# Patient Record
Sex: FEMALE | Race: ASIAN | NOT HISPANIC OR LATINO | URBAN - METROPOLITAN AREA
[De-identification: names, ages, dates, MRNs, and addresses within clinical notes are randomized per-mention and may not be internally consistent; named-entity substitution may affect disease eponyms.]

---

## 2017-01-16 ENCOUNTER — OFFICE VISIT (OUTPATIENT)
Dept: OTOLARYNGOLOGY | Facility: CLINIC | Age: 62
End: 2017-01-16

## 2017-01-16 DIAGNOSIS — R09.89 CHRONIC THROAT CLEARING: ICD-10-CM

## 2017-01-16 DIAGNOSIS — R07.0 THROAT PAIN: Primary | ICD-10-CM

## 2017-01-16 DIAGNOSIS — J38.7 IRRITABLE LARYNX SYNDROME: Primary | ICD-10-CM

## 2017-01-16 DIAGNOSIS — J38.7 IRRITABLE LARYNX: ICD-10-CM

## 2017-01-16 DIAGNOSIS — R07.0 THROAT PAIN: ICD-10-CM

## 2017-01-16 RX ORDER — CHLORAL HYDRATE 500 MG
2 CAPSULE ORAL DAILY
COMMUNITY
End: 2018-04-24 | Stop reason: ALTCHOICE

## 2017-01-16 RX ORDER — CALCIUM CARBONATE 500(1250)
500 TABLET ORAL 2 TIMES DAILY
COMMUNITY
End: 2018-04-24 | Stop reason: ALTCHOICE

## 2017-01-16 ASSESSMENT — PAIN SCALES - GENERAL: PAINLEVEL: NO PAIN (0)

## 2017-01-16 NOTE — Clinical Note
1/16/2017      RE: CARLITOS Tiwari  [address] [facility] [address]       Dear Mr. Hull:    I had the pleasure of meeting CARLITOS Tiwari in consultation at the [facility] of the [facility] at your request, for evaluation of dysphagia. The note from our visit follows.  I appreciate the opportunity to participate in the care of this pleasant patient.    Please feel free to contact me with any questions.    Sincerely yours,    Sherrell Arguello M.D., M.P.H.  , Laryngology  Director, [facility] Voice [facility]  Otolaryngology- Head & Neck Surgery  [phone]          =====    History of Present Illness:   CARLITOS Tiwari is a pleasant 61-year-old female who presents with a history of dysphagia. Symptoms include pain/ache in throat and frequent throat-clearing.      Voice  No concerns      Swallowing, Throat-clearing, Cough  She has had symptoms for about a year. She states it is intermittent and began gradually.  She reports palate discomfort, and sometimes a sore throat, but the palate bothers her more.  She clicks her palate and sometimes clears her throat because it feels like something is there. The palate clicking causes pain. Her cough is typically dry.     These symptoms come and go over a couple weeks at a time. In between episodes she feels normal. She is not sure what provokes the symptoms. She was taking reflux medications, but has stopped, with no change in her symptoms. However, certain foods like tomatoes do provoke symptoms. She has no nasal symptoms. She was treated for possible sinus symptoms with antibiotics/steroids and a nasal spray, which did not help.  She does not experience things going down the wrong tube. Her swallowing works, but feels effortful.  She does experience increased swallowing effort with solids.      Breathing  No concerns.      Reflux-type symptoms  She experiences heartburn/indigestion weekly. She is  not taking reflux medications.    EGD showed a small hiatal hernia.  She has a history of H. pylori gastritis, which was treated about a year ago.      Prior outside records were reviewed for this visit.    MEDICATIONS:     Current Outpatient Prescriptions   Medication Sig Dispense Refill     Multiple Vitamins-Minerals (MULTIVITAMIN ADULT PO)        calcium carbonate (OS- MG [place]. [place]) 500 MG tablet Take 500 mg by mouth 2 times daily       fish oil-omega-3 fatty acids 1000 MG capsule Take 2 g by mouth daily         ALLERGIES:  Allergies not on file    PAST MEDICAL HISTORY: No past medical history on file.   Reflux  Osteoporosis  Vitamin D deficiency  Bilateral cataracts  Glaucoma    PAST SURGICAL HISTORY: No past surgical history on file.   Appendectomy  Cataract surgeries    HABITS/SOCIAL HISTORY:    Social History   Substance Use Topics     Smoking status: Not on file     Smokeless tobacco: Not on file     Alcohol Use: Not on file         FAMILY HISTORY:  No family history on file.    REVIEW OF SYSTEMS:  The patient completed a comprehensive 11 point review of systems (below), which was reviewed. Positives are as noted below; pertinent findings are as noted in the HPI.     Patient Supplied Answers to Review of Systems   ENT ROS 1/16/2017   Ears, Nose, Throat Trouble swallowing   Gastrointestinal/Genitourinary Heartburn/indigestion       PHYSICAL EXAMINATION:  General: The patient was alert and conversant, and in no acute distress. Patient accompanied by her spouse.  Eyes: PERRL, conjunctiva and lids normal, sclera nonicteric.  Nose: Anterior rhinoscopy: no gross abnormalities. no  bleeding; no  mucopurulence; septum grossly normal, mild mucoid drainage and/or crusting.  Oral cavity/oropharynx: No masses or lesions. Dentition in fair condition. Floor of mouth and oral tongue soft to palpation. Tongue mobility and palate elevation intact and symmetric.  Ears: Normal auricles, external auditory canals  bilaterally. Visualized portions of tympanic membranes normal bilaterally.   Neck: No palpable cervical lymphadenopathy. There was moderate  tenderness to palpation of the thyrohyoid space. No obvious thyroid abnormality. Landmarks palpable.  Resp: Breathing comfortably, no stridor or stertor.  Neuro: Symmetric facial function. Other cranial nerves as documented above.  Psych: Normal affect, pleasant and cooperative.  Voice/speech: Mild dysphonia characterized by roughness and strain. Frequent throat clearing and palate clicking.  Extremities: No cyanosis, clubbing, or edema of the upper extremities.      Intake scores  Total Score for Last Patient-Answered VHI Questionnaire  VHI Total Score 1/16/2017   VHI Total Score 0     Total Score for Last Patient-Answered EAT Questionnaire  EAT Total Score 1/16/2017   EAT Total Score 10     Total Score for Last Patient-Answered CSI Questionnaire  CSI Total Score 1/16/2017   CSI Total Score 0         PROCEDURE:   Flexible fiberoptic laryngoscopy  Indications: This procedure was warranted to evaluate the patient's laryngeal function. Risks, benefits, and alternatives were discussed.  Description: After written informed consent was obtained, a time-out was performed to confirm patient identity, procedure, and procedure site. Topical 3% lidocaine with 0.25% phenylephrine was applied to the nasal cavities. I performed the endoscopy and no complications were apparent.  Performed by: Sherrell Arguello MD MPH  SLP: Kevin Frausto MM, MA, CCC-SLP   Findings: Normal nasopharynx. Normal base of tongue, valleculae, and epiglottis. The right true vocal fold demonstrated normal mobility. The left true vocal fold demonstrated normal mobility. The medial edges of the vocal folds appeared smooth and straight. No focal mucosal lesions were observed on the true vocal folds. Glissade was associated with some elongation although patient had difficulty elevating pitch into falsetto. There was  moderate supraglottic recruitment with connected speech. Mucosa of the false vocal folds, aryepiglottic folds, piriform sinuses, and posterior glottis unremarkable. Airway was patent.       IMAGING/RESULTS reviewed, with pertinent report excerpts below:  11/3/16 Video swallow with speech  Normal videofluoroscopic swallow exam    Manometry September 2016  Resting LES pressure normal, but residual pressure elevated at 15.6 mm. Esophageal body motor function normal. Noted to have outflow tract obstruction.    pH Impedance study: negative [place], no proximal esophageal reflux    IMPRESSION AND PLAN:   CARLITOS Tiwari presents with irritable larynx syndrome as well as pain associated with volitional palate clicking which she does in an attempt to address her throat irritation. This is in the context of a history of H. Pylori gastritis and known small hiatal hernia, but her GI issues sound like they are managed and the H. Pylori has been treated.    I recommended speech therapy as initial primary management, with goals including improving laryngeal hygiene, reducing laryngeal irritability, decreasing vocal fold trauma and improving respiratory/phonatory coordination. She will return as needed. I appreciate the opportunity to participate in the care of this patient.       Sherrell Arguello MD

## 2017-01-16 NOTE — Clinical Note
1/16/2017       RE: CARLITOS Tiwari  [address] [facility] [address]     Dear Colleague,    Thank you for referring your patient, CARLITOS Tiwari, to the [facility] EAR NOSE AND THROAT at [facility]. Please see a copy of my visit note below.    No notes on file    Again, thank you for allowing me to participate in the care of your patient.      Sincerely,    Sherrell Arguello MD

## 2017-01-16 NOTE — Clinical Note
1/16/2017       RE: CARLITOS Tiwari  [address] [facility] [address]     Dear Colleague,    Thank you for referring your patient, CARLITOS Tiwari, to the [facility] at [facility]. Please see a copy of my visit note below.    [facility]ICE [facility]  Jose Philip Jr., M.D., F.A.C.S.  Sherrell Arguello M.D., M.P.H.  Maribel Martínez, Ph.D., CCC/SLP  Amaris Garcia M.M. (voice), M.A., CCC/SLP  Thong Frausto M.M. (voice), M.A., [facility]/SLP    [facility] VOICE [facility]  INITIAL EVALUATION AND LARYNGEAL EXAMINATION REPORT    Patient: CARLITOS Tiwari  Date of Visit: 1/16/2017    CHIEF COMPLAINT: Throat pain, effortful swallowing  HISTORY  PATIENT INFORMATION  CARLITOS Tiwari was seen for initial voice evaluation, laryngeal examination and treatment in conjunction with a visit to Dr. Arguello.  Please refer to Dr. Arguello s dictation for a more complete history and impressions.   She was referred to this clinic by Dr. Hull at [facility].       DIAGNOSIS/REASON FOR REFERRAL  Dysphonia/ Evaluate, perform laryngeal exam, treat as appropriate    HISTORY OF VOICE DISORDER  Ms. Tiwari is a 61 year old woman with a history of throat and palatal pain, and corresponding effortful swallowing.  Salient details of her history are as follows:    Symptoms began approximately 1 year ago at approximately the same time as she was being treated for H pylori gastritis    She describes discomfort which originates near the palate.      In response to this discomfort she repeatedly clicks her palate, and subsequently the pain will progress to the level of the larynx, predominately on the right side    When the pain progresses to this level it is accompanied by increased effort swallowing.     She has had significant testing to discern the origin of the discomfort including:  o Swallow study - WNL  o EGD - small hiatal hernia  o Reflux medications - No benefit  o Potential sinus origin treated with nasal  "steroids and antibiotics - no benefit    Symptoms come and go for sometimes up to weeks at a time, but without obvious trigger.    CURRENT SYMPTOMS INCLUDE:  REFLUX    Longstanding history of reflux    At this point she experiences symptoms weekly    COUGH/AIRWAY    Frequent palatal clicking and throat clearing     She reports no behavioral for the palatal clicking    Symptoms seem to be worse if she has eaten tomatoes or dairy    Throat clearing may be brought on by exposure to cold air    SWALLOWING    Increased effort with swallowing solids    These symptoms correspond to right sided throat discomfort    Never experiences a sensation of food going down the wrong pipe    She denies significant dyspnea or voice changes    OTHER PERTINENT HISTORY    Please also refer to Dr. Arguello's dictation.     OBJECTIVE FINDINGS   Patient Supplied Answers To Last 2 VHI Questionnaires  Voice Handicap Index (VHI-10) 1/16/2017   How often do you have any of the following symptoms:  Indigestion, heartburn, chest pain, stomach acid coming up, and/or tasting acid in your mouth or throat? Rarely   (F1) My voice makes it difficult for people to hear me. Never   (F2) People have difficulty understanding me in a noisy room. Never   (F8) My voice difficulties restrict my personal and social life. Never   (F9) I feel left out of conversations because of my voice. Never   (F10) My voice problem causes me to lose income. Never   (P5) I feel as though I have to strain to produce voice. Never   (P6) The clarity of my voice is unpredictable. Never   (E4) My voice problem upsets me. Never   (E6) My voice makes me feel handicapped. Never   (P3) People ask, \"What's wrong with your voice?\" Never   VHI Total Score 0        Patient Supplied Answers To CSI Questionnaire  Cough Severity Index (CSI) 1/16/2017   My cough is worse when I lie down. Never   My coughing problem causes me to restrict my personal and social life. Never   I tend to avoid places " "because of my cough problem. Never   I feel embarrassed because of my coughing problem. Never   People ask, \"What's wrong?\" because I cough a lot. Never   I run out of air when I cough. Never   My coughing problem affects my voice. Never   My coughing problem limits my physical activity. Never   My coughing problem upsets me. Never   People ask me if I am sick because I cough a lot. Never   CSI Total Score 0        Patient Supplied Answers To EAT Questionnaire  Eating Assessment Tool (EAT-10) 1/16/2017   My swallowing problem has caused me to lose weight. 0   My swallowing problem interferes with my ability to go out for meals. 2   Swallowing solids takes extra effort. 2   Swallowing pills takes extra effort. 0   Swallowing is painful. 2   The pleasure of eating is affected by my swallowing. 1   When I swallow food sticks in my throat. 2   I cough when I eat. 0   Swallowing is stressful. 1   How often do things go down the wrong way when you swallow? Rarely   Have you had pneumonia, bronchitis, or another severe lung infection in the last 6 months? No   EAT Total Score 10           VOICE AND SPEECH EVALUATION  An evaluation of the voice and cough was accomplished and audio recorded today; salient features are as follows:    Palpation of the laryngeal area: reduced thyrohyoid space    Breathing pattern: shallow and phonation is not coordinated with respiration    Tension is evident: jaw and neck and shoulders    VOICE:    Minimal breathiness    Mild, Intermittent roughness    Mild, Consistent strain    Mild aesthenia    Habitual pitch was not formally tested, but is judged to be WNL and appropriate    Intensity:     Conversational speech - informally judged to be mildly reduced for the setting    Sustained phonation: consistent with overall perceptual ratings    She states today is a typical voice day    CAPE- Overall Severity:  28/100    COUGH/AIRWAY:    Frequent throat clearing and palatal clicking    Was not " noted to be be specifically tied with a given activity    Dry    Locus of cough/ throat clear: sounds consistent with upper airway     Severity: mild to moderate    Percent of time symptoms are self controlled: < 25%    LARYNGEAL EXAMINATION  Dr. Arguello accomplished the endoscopic laryngeal examination today.  I provided technical support, and provided the protocol of instructions for the patient.  Verbal consent was obtained and witnessed prior to this procedure.   A time-out was performed, verifying patient, procedure, and site.   Type of exam:   Procedure: Flexible endoscopy with chip-tip technology without stroboscopy, right nostril; topical anesthesia with 3% Lidocaine and 0.25% phenylephrine was applied.   This exam shows:    Essentially healthy laryngeal and vocal fold mucosa    Signs of reflux: posterior commissure hypertrophy    Secretions:  mild diffuse presence of thickened secretions on the vocal folds and throughout the laryngeal area    Vocal fold mucosa:  within normal limits, no visible lesions    Vocal fold function: Vocal folds are mobile and meet at midline    Movement is brisk and symmetric    Exam is neurologically normal     Airway is adequate    elongation of the vocal folds for pitch increase is difficult to fully establish as patient demonstrated difficulty with accessing elevated pitch    Glottic adduction: on phonation glottic closure is mildly pressed    Moderate four-way constriction of the supraglottic larynx during connected speech    Therapy probes show reduced hyperfunction with forward resonant stimuli    Stroboscopy was not warranted      Supraglottic hyperfunction during running speech      Essentially healthy laryngeal and vocal fold mucosa    Dr. Arguello and I reviewed this laryngeal exam with Ms. Tiwari today, and I provided pertinent explanations, as well as written and oral information.    THERAPEUTIC ACTIVITIES  Today Ms. Tiwari participated in the following therapeutic  activities:    Asked many questions about the nature of her symptoms, and I answered all of these thoroughly.    Instructed concepts and techniques for optimal vocal hygiene including:    Systemic hydration, including strategies for increasing daily water intake    Topical hydration - Gargling, saline nasal irrigation, humidification, steam, guaifenesin    Environmental barriers to healthy voicing - noise, inhaled irritants, room acoustics    Use of electronic amplification to reduce vocal fold impact    Awareness and reduction of phonotraumatic behaviors    Moderating voice use    Substituting non-voice alternative behaviors    Avoiding cough and throat clearing    Management of laryngopharyngeal reflux disease (LPRD)    Dietary alterations which may reduce liklihood of reflux events    Chronic cough / throat clearing reduction therapy    Suppression and substitution strategies were instructed including    Swallowing substitution techniques    Breathing suppression techniques to reduce laryngeal tension    Low impact glottic coup and soft cough    Techniques to raise awareness of habitual throat clearing    Additionally she was instructed to keep a log of what circumstances are eliciting cough / throat clear      Concepts of an optimal regimen for practice were instructed.    She should use an interval schedule of practice, with brief periods of practice frequently throughout each day    [place] concepts of volitional practice to facilitate motor learning.    I provided handouts of today's therapeutic activities to facilitate practice.    IMPRESSIONS/ RECOMMENDATIONS/ PLAN  IMPRESSIONS / RECOMMENDATIONS  Based on today's evaluation and laryngeal examination, it appears that:    Cough, throat-clearing, and palatal clicking are accounted for by the hypersensitivity of the larynx and pharynx consistent with Irritable Larynx Syndrome as evidenced by case history, patient complaints and absence of other organic  findings; hypersensitivity and discomfort are compounded by imbalance in the function of the intrinsic and extrinsic laryngeal musculature during connected speech    A course of speech therapy is recommended to optimize vocal technique, promote reduced discomfort, effort and fatigue and help reduce throat clear, mucosal irritation and palatal clicking.    She is entirely amenable to this plan    We began therapy today, working on strategies to improve vocal health and reduce behaviors contributing to ongoing mucosal irritation.    TREATMENT PLAN  Speech therapy    DURATION/FREQUENCY OF TREATMENT  Four bi-weekly, one-hour sessions, with two monthly one-hour follow-up sessions    PROGNOSIS  Good prognosis for voice improvement with speech therapy and regular practice of therapeutic activities.    BARRIERS TO LEARNING/TEACHING AND LEARNING NEEDS  None/Unremarkable    GOALS  Patient goal:   To understand the problem and fix it as much as possible  To reduce her discomfort to acceptable levels    Short-term goal(s): Within the first 4 sessions, Ms. Tiwari:  1. will demonstrate assigned laryngeal massage techniques with 80% accuracy or better with no clinician support  2. will demonstrate improved awareness of throat clearing / cough: acknowledging >75% of all cough events during session time with no clinician support  3. will be able to demonstrate provided cough suppression and substitution strategies from memory independently with 90% accuracy  4. will be able to independently list key factors in maintenance of good vocal hygiene with 80% accuracy, and report on their use outside the therapy room.  5. will demonstrate semi-occluded vocal tract (SOVT) exercises with at least 80% accuracy with no clinician support    Long-term goal(s): In 6 months, Ms. Tiwari will:  1. Report a week of typical activities, in which throat/palatal discomfort does not exceed a level of 3 out of 10, 80% of the time    PRIMARY ICD-10 code:   J38.7 (Irritable Larynx Syndrome)  SECONDARY ICD-10 code:  R07.0 (Throat Pain)   TERTIARY ICD-10 code:  R68.89 (Chronic Throat Clearing)    TOTAL SERVICE TIME: 80 minutes  EVALUATION OF VOICE AND RESONANCE: (48854): 40 minutes    TREATMENT (99193): 40 minutes  NO CHARGE FACILITY FEE (47433)    Kevin Frausto M.M., M.A., CCC-SLP  Speech-Language Pathologist  Certificate of Vocology  [phone]  =

## 2017-01-16 NOTE — PROGRESS NOTES
[facility] VOICE CLINIC  Jose Philip Jr., M.D., F.A.C.S.  Sherrell Arguello M.D., M.P.H.  Maribel Martínez, Ph.D., CCC/SLP  Amaris Garcia M.M. (voice), M.TIM., CCC/SLP  Thong Frausto M.M. (voice), M.TIM., [facility]/SLP    [facility] VOICE CLINIC  INITIAL EVALUATION AND LARYNGEAL EXAMINATION REPORT    Patient: CARLITOS Tiwari  Date of Visit: 1/16/2017    CHIEF COMPLAINT: Throat pain, effortful swallowing  HISTORY  PATIENT INFORMATION  VI Antoinette Coronel was seen for initial voice evaluation, laryngeal examination and treatment in conjunction with a visit to Dr. Arguello.  Please refer to Dr. Arguello s dictation for a more complete history and impressions.   She was referred to this clinic by Dr. Hull at [facility].       DIAGNOSIS/REASON FOR REFERRAL  Dysphonia/ Evaluate, perform laryngeal exam, treat as appropriate    HISTORY OF VOICE DISORDER  Ms. Tiwari is a 61 year old woman with a history of throat and palatal pain, and corresponding effortful swallowing.  Salient details of her history are as follows:    Symptoms began approximately 1 year ago at approximately the same time as she was being treated for H pylori gastritis    She describes discomfort which originates near the palate.      In response to this discomfort she repeatedly clicks her palate, and subsequently the pain will progress to the level of the larynx, predominately on the right side    When the pain progresses to this level it is accompanied by increased effort swallowing.     She has had significant testing to discern the origin of the discomfort including:  o Swallow study - WNL  o EGD - small hiatal hernia  o Reflux medications - No benefit  o Potential sinus origin treated with nasal steroids and antibiotics - no benefit    Symptoms come and go for sometimes up to weeks at a time, but without obvious trigger.    CURRENT SYMPTOMS INCLUDE:  REFLUX    Longstanding history of reflux    At this point she experiences symptoms  "weekly    COUGH/AIRWAY    Frequent palatal clicking and throat clearing     She reports no behavioral for the palatal clicking    Symptoms seem to be worse if she has eaten tomatoes or dairy    Throat clearing may be brought on by exposure to cold air    SWALLOWING    Increased effort with swallowing solids    These symptoms correspond to right sided throat discomfort    Never experiences a sensation of food going down the wrong pipe    She denies significant dyspnea or voice changes    OTHER PERTINENT HISTORY    Please also refer to Dr. Arguello's dictation.     OBJECTIVE FINDINGS   Patient Supplied Answers To Last 2 VHI Questionnaires  Voice Handicap Index (VHI-10) 1/16/2017   How often do you have any of the following symptoms:  Indigestion, heartburn, chest pain, stomach acid coming up, and/or tasting acid in your mouth or throat? Rarely   (F1) My voice makes it difficult for people to hear me. Never   (F2) People have difficulty understanding me in a noisy room. Never   (F8) My voice difficulties restrict my personal and social life. Never   (F9) I feel left out of conversations because of my voice. Never   (F10) My voice problem causes me to lose income. Never   (P5) I feel as though I have to strain to produce voice. Never   (P6) The clarity of my voice is unpredictable. Never   (E4) My voice problem upsets me. Never   (E6) My voice makes me feel handicapped. Never   (P3) People ask, \"What's wrong with your voice?\" Never   VHI Total Score 0        Patient Supplied Answers To CSI Questionnaire  Cough Severity Index (CSI) 1/16/2017   My cough is worse when I lie down. Never   My coughing problem causes me to restrict my personal and social life. Never   I tend to avoid places because of my cough problem. Never   I feel embarrassed because of my coughing problem. Never   People ask, \"What's wrong?\" because I cough a lot. Never   I run out of air when I cough. Never   My coughing problem affects my voice. Never "   My coughing problem limits my physical activity. Never   My coughing problem upsets me. Never   People ask me if I am sick because I cough a lot. Never   CSI Total Score 0        Patient Supplied Answers To EAT Questionnaire  Eating Assessment Tool (EAT-10) 1/16/2017   My swallowing problem has caused me to lose weight. 0   My swallowing problem interferes with my ability to go out for meals. 2   Swallowing solids takes extra effort. 2   Swallowing pills takes extra effort. 0   Swallowing is painful. 2   The pleasure of eating is affected by my swallowing. 1   When I swallow food sticks in my throat. 2   I cough when I eat. 0   Swallowing is stressful. 1   How often do things go down the wrong way when you swallow? Rarely   Have you had pneumonia, bronchitis, or another severe lung infection in the last 6 months? No   EAT Total Score 10           VOICE AND SPEECH EVALUATION  An evaluation of the voice and cough was accomplished and audio recorded today; salient features are as follows:    Palpation of the laryngeal area: reduced thyrohyoid space    Breathing pattern: shallow and phonation is not coordinated with respiration    Tension is evident: jaw and neck and shoulders    VOICE:    Minimal breathiness    Mild, Intermittent roughness    Mild, Consistent strain    Mild aesthenia    Habitual pitch was not formally tested, but is judged to be WNL and appropriate    Intensity:     Conversational speech - informally judged to be mildly reduced for the setting    Sustained phonation: consistent with overall perceptual ratings    She states today is a typical voice day    CAPE-V Overall Severity:  28/100    COUGH/AIRWAY:    Frequent throat clearing and palatal clicking    Was not noted to be be specifically tied with a given activity    Dry    Locus of cough/ throat clear: sounds consistent with upper airway     Severity: mild to moderate    Percent of time symptoms are self controlled: < 25%    LARYNGEAL  EXAMINATION  Dr. Arguello accomplished the endoscopic laryngeal examination today.  I provided technical support, and provided the protocol of instructions for the patient.  Verbal consent was obtained and witnessed prior to this procedure.   A time-out was performed, verifying patient, procedure, and site.   Type of exam:   Procedure: Flexible endoscopy with chip-tip technology without stroboscopy, right nostril; topical anesthesia with 3% Lidocaine and 0.25% phenylephrine was applied.   This exam shows:    Essentially healthy laryngeal and vocal fold mucosa    Signs of reflux: posterior commissure hypertrophy    Secretions:  mild diffuse presence of thickened secretions on the vocal folds and throughout the laryngeal area    Vocal fold mucosa:  within normal limits, no visible lesions    Vocal fold function: Vocal folds are mobile and meet at midline    Movement is brisk and symmetric    Exam is neurologically normal     Airway is adequate    elongation of the vocal folds for pitch increase is difficult to fully establish as patient demonstrated difficulty with accessing elevated pitch    Glottic adduction: on phonation glottic closure is mildly pressed    Moderate four-way constriction of the supraglottic larynx during connected speech    Therapy probes show reduced hyperfunction with forward resonant stimuli    Stroboscopy was not warranted      Supraglottic hyperfunction during running speech      Essentially healthy laryngeal and vocal fold mucosa    Dr. Arguello and I reviewed this laryngeal exam with Ms. Tiwari today, and I provided pertinent explanations, as well as written and oral information.    THERAPEUTIC ACTIVITIES  Today Ms. Tiwari participated in the following therapeutic activities:    Asked many questions about the nature of her symptoms, and I answered all of these thoroughly.    Instructed concepts and techniques for optimal vocal hygiene including:    Systemic hydration, including strategies for  increasing daily water intake    Topical hydration - Gargling, saline nasal irrigation, humidification, steam, guaifenesin    Environmental barriers to healthy voicing - noise, inhaled irritants, room acoustics    Use of electronic amplification to reduce vocal fold impact    Awareness and reduction of phonotraumatic behaviors    Moderating voice use    Substituting non-voice alternative behaviors    Avoiding cough and throat clearing    Management of laryngopharyngeal reflux disease (LPRD)    Dietary alterations which may reduce liklihood of reflux events    Chronic cough / throat clearing reduction therapy    Suppression and substitution strategies were instructed including    Swallowing substitution techniques    Breathing suppression techniques to reduce laryngeal tension    Low impact glottic coup and soft cough    Techniques to raise awareness of habitual throat clearing    Additionally she was instructed to keep a log of what circumstances are eliciting cough / throat clear      Concepts of an optimal regimen for practice were instructed.    She should use an interval schedule of practice, with brief periods of practice frequently throughout each day    [place] concepts of volitional practice to facilitate motor learning.    I provided handouts of today's therapeutic activities to facilitate practice.    IMPRESSIONS/ RECOMMENDATIONS/ PLAN  IMPRESSIONS / RECOMMENDATIONS  Based on today's evaluation and laryngeal examination, it appears that:    Cough, throat-clearing, and palatal clicking are accounted for by the hypersensitivity of the larynx and pharynx consistent with Irritable Larynx Syndrome as evidenced by case history, patient complaints and absence of other organic findings; hypersensitivity and discomfort are compounded by imbalance in the function of the intrinsic and extrinsic laryngeal musculature during connected speech    A course of speech therapy is recommended to optimize vocal technique,  promote reduced discomfort, effort and fatigue and help reduce throat clear, mucosal irritation and palatal clicking.    She is entirely amenable to this plan    We began therapy today, working on strategies to improve vocal health and reduce behaviors contributing to ongoing mucosal irritation.    TREATMENT PLAN  Speech therapy    DURATION/FREQUENCY OF TREATMENT  Four bi-weekly, one-hour sessions, with two monthly one-hour follow-up sessions    PROGNOSIS  Good prognosis for voice improvement with speech therapy and regular practice of therapeutic activities.    BARRIERS TO LEARNING/TEACHING AND LEARNING NEEDS  None/Unremarkable    GOALS  Patient goal:   To understand the problem and fix it as much as possible  To reduce her discomfort to acceptable levels    Short-term goal(s): Within the first 4 sessions, Ms. Tiwari:  1. will demonstrate assigned laryngeal massage techniques with 80% accuracy or better with no clinician support  2. will demonstrate improved awareness of throat clearing / cough: acknowledging >75% of all cough events during session time with no clinician support  3. will be able to demonstrate provided cough suppression and substitution strategies from memory independently with 90% accuracy  4. will be able to independently list key factors in maintenance of good vocal hygiene with 80% accuracy, and report on their use outside the therapy room.  5. will demonstrate semi-occluded vocal tract (SOVT) exercises with at least 80% accuracy with no clinician support    Long-term goal(s): In 6 months, Ms. Tiwari will:  1. Report a week of typical activities, in which throat/palatal discomfort does not exceed a level of 3 out of 10, 80% of the time    PRIMARY ICD-10 code:  J38.7 (Irritable Larynx Syndrome)  SECONDARY ICD-10 code:  R07.0 (Throat Pain)   TERTIARY ICD-10 code:  R68.89 (Chronic Throat Clearing)    TOTAL SERVICE TIME: 80 minutes  EVALUATION OF VOICE AND RESONANCE: (32414): 40 minutes     TREATMENT (18874): 40 minutes  NO CHARGE FACILITY FEE (82011)    Kevin Frausto M.M., M.A., CCC-SLP  Speech-Language Pathologist  Certificate of Vocology  [phone]

## 2017-01-16 NOTE — NURSING NOTE
Chief Complaint   Patient presents with     Consult     Dysphagia , throat pain     John Garcia LPN

## 2017-06-09 NOTE — PROGRESS NOTES
Dear Mr. Hull:    I had the pleasure of meeting CARLITOS Tiwari in consultation at the [facility] Voice [facility] of the [facility] at your request, for evaluation of dysphagia. The note from our visit follows.  I appreciate the opportunity to participate in the care of this pleasant patient.    Please feel free to contact me with any questions.    Sincerely yours,    Sherrell Arguello M.D., M.P.H.  , Laryngology  Director, [facility]  Otolaryngology- Head & Neck Surgery  [phone]          =====    History of Present Illness:   CARLITOS Tiwari is a pleasant 61-year-old female who presents with a history of dysphagia. Symptoms include pain/ache in throat and frequent throat-clearing.      Voice  No concerns      Swallowing, Throat-clearing, Cough  She has had symptoms for about a year. She states it is intermittent and began gradually.  She reports palate discomfort, and sometimes a sore throat, but the palate bothers her more.  She clicks her palate and sometimes clears her throat because it feels like something is there. The palate clicking causes pain. Her cough is typically dry.     These symptoms come and go over a couple weeks at a time. In between episodes she feels normal. She is not sure what provokes the symptoms. She was taking reflux medications, but has stopped, with no change in her symptoms. However, certain foods like tomatoes do provoke symptoms. She has no nasal symptoms. She was treated for possible sinus symptoms with antibiotics/steroids and a nasal spray, which did not help.  She does not experience things going down the wrong tube. Her swallowing works, but feels effortful.  She does experience increased swallowing effort with solids.      Breathing  No concerns.      Reflux-type symptoms  She experiences heartburn/indigestion weekly. She is not taking reflux medications.    EGD showed a small hiatal hernia.  She has  a history of H. pylori gastritis, which was treated about a year ago.      Prior outside records were reviewed for this visit.    MEDICATIONS:     Current Outpatient Prescriptions   Medication Sig Dispense Refill     Multiple Vitamins-Minerals (MULTIVITAMIN ADULT PO)        calcium carbonate (OS- MG [place]. [place]) 500 MG tablet Take 500 mg by mouth 2 times daily       fish oil-omega-3 fatty acids 1000 MG capsule Take 2 g by mouth daily         ALLERGIES:  Allergies not on file    PAST MEDICAL HISTORY: No past medical history on file.   Reflux  Osteoporosis  Vitamin D deficiency  Bilateral cataracts  Glaucoma    PAST SURGICAL HISTORY: No past surgical history on file.   Appendectomy  Cataract surgeries    HABITS/SOCIAL HISTORY:    Social History   Substance Use Topics     Smoking status: Not on file     Smokeless tobacco: Not on file     Alcohol Use: Not on file         FAMILY HISTORY:  No family history on file.    REVIEW OF SYSTEMS:  The patient completed a comprehensive 11 point review of systems (below), which was reviewed. Positives are as noted below; pertinent findings are as noted in the HPI.     Patient Supplied Answers to Review of Systems   ENT ROS 1/16/2017   Ears, Nose, Throat Trouble swallowing   Gastrointestinal/Genitourinary Heartburn/indigestion       PHYSICAL EXAMINATION:  General: The patient was alert and conversant, and in no acute distress. Patient accompanied by her spouse.  Eyes: PERRL, conjunctiva and lids normal, sclera nonicteric.  Nose: Anterior rhinoscopy: no gross abnormalities. no  bleeding; no  mucopurulence; septum grossly normal, mild mucoid drainage and/or crusting.  Oral cavity/oropharynx: No masses or lesions. Dentition in fair condition. Floor of mouth and oral tongue soft to palpation. Tongue mobility and palate elevation intact and symmetric.  Ears: Normal auricles, external auditory canals bilaterally. Visualized portions of tympanic membranes normal bilaterally.   Neck:  No palpable cervical lymphadenopathy. There was moderate  tenderness to palpation of the thyrohyoid space. No obvious thyroid abnormality. Landmarks palpable.  Resp: Breathing comfortably, no stridor or stertor.  Neuro: Symmetric facial function. Other cranial nerves as documented above.  Psych: Normal affect, pleasant and cooperative.  Voice/speech: Mild dysphonia characterized by roughness and strain. Frequent throat clearing and palate clicking.  Extremities: No cyanosis, clubbing, or edema of the upper extremities.      Intake scores  Total Score for Last Patient-Answered VHI Questionnaire  VHI Total Score 1/16/2017   VHI Total Score 0     Total Score for Last Patient-Answered EAT Questionnaire  EAT Total Score 1/16/2017   EAT Total Score 10     Total Score for Last Patient-Answered CSI Questionnaire  CSI Total Score 1/16/2017   CSI Total Score 0         PROCEDURE:   Flexible fiberoptic laryngoscopy  Indications: This procedure was warranted to evaluate the patient's laryngeal function. Risks, benefits, and alternatives were discussed.  Description: After written informed consent was obtained, a time-out was performed to confirm patient identity, procedure, and procedure site. Topical 3% lidocaine with 0.25% phenylephrine was applied to the nasal cavities. I performed the endoscopy and no complications were apparent.  Performed by: Sherrell Arguello MD MPH  SLP: Kevin Frausto MM, MA, CCC-SLP   Findings: Normal nasopharynx. Normal base of tongue, valleculae, and epiglottis. The right true vocal fold demonstrated normal mobility. The left true vocal fold demonstrated normal mobility. The medial edges of the vocal folds appeared smooth and straight. No focal mucosal lesions were observed on the true vocal folds. Glissade was associated with some elongation although patient had difficulty elevating pitch into falsetto. There was moderate supraglottic recruitment with connected speech. Mucosa of the false vocal  folds, aryepiglottic folds, piriform sinuses, and posterior glottis unremarkable. Airway was patent.       IMAGING/RESULTS reviewed, with pertinent report excerpts below:  11/3/16 Video swallow with speech  Normal videofluoroscopic swallow exam    Manometry September 2016  Resting LES pressure normal, but residual pressure elevated at 15.6 mm. Esophageal body motor function normal. Noted to have outflow tract obstruction.    pH Impedance study: negative [place], no proximal esophageal reflux    IMPRESSION AND PLAN:   CARLITOS Tiwari presents with irritable larynx syndrome as well as pain associated with volitional palate clicking which she does in an attempt to address her throat irritation. This is in the context of a history of H. Pylori gastritis and known small hiatal hernia, but her GI issues sound like they are managed and the H. Pylori has been treated.    I recommended speech therapy as initial primary management, with goals including improving laryngeal hygiene, reducing laryngeal irritability, decreasing vocal fold trauma and improving respiratory/phonatory coordination. She will return as needed. I appreciate the opportunity to participate in the care of this patient.      Never smoker

## 2017-10-17 ENCOUNTER — TRANSFERRED RECORDS (OUTPATIENT)
Dept: HEALTH INFORMATION MANAGEMENT | Facility: CLINIC | Age: 62
End: 2017-10-17

## 2018-01-09 ENCOUNTER — TRANSFERRED RECORDS (OUTPATIENT)
Dept: HEALTH INFORMATION MANAGEMENT | Facility: CLINIC | Age: 63
End: 2018-01-09

## 2018-03-06 ENCOUNTER — TRANSFERRED RECORDS (OUTPATIENT)
Dept: HEALTH INFORMATION MANAGEMENT | Facility: CLINIC | Age: 63
End: 2018-03-06

## 2018-03-21 ENCOUNTER — TRANSFERRED RECORDS (OUTPATIENT)
Dept: HEALTH INFORMATION MANAGEMENT | Facility: CLINIC | Age: 63
End: 2018-03-21

## 2018-03-26 ENCOUNTER — MEDICAL CORRESPONDENCE (OUTPATIENT)
Dept: HEALTH INFORMATION MANAGEMENT | Facility: CLINIC | Age: 63
End: 2018-03-26

## 2018-04-20 ENCOUNTER — PRE VISIT (OUTPATIENT)
Dept: NEUROLOGY | Facility: CLINIC | Age: 63
End: 2018-04-20

## 2018-04-20 NOTE — TELEPHONE ENCOUNTER
FUTURE VISIT INFORMATION      FUTURE VISIT INFORMATION:    Date: 4/24/18    Time: 3:30PM    Location: [facility]  REFERRAL INFORMATION:    Referring provider:  Dr. Brayden Pineda    Referring providers clinic:  [facility]    Reason for visit/diagnosis  2nd Opinion L leg numbness     RECORDS REQUESTED FROM:       Clinic name Comments Records Status Imaging Status   [facility]  Faxed request to clinic 4/20/18 @ 1:20PM pending                                    RECORDS STATUS

## 2018-04-23 NOTE — TELEPHONE ENCOUNTER
RECORDS RECEIVED FROM: [facility]   DATE RECEIVED: 4/23/18   NOTES (FOR ALL VISITS) STATUS DETAILS   OFFICE NOTE from referring provider Received 1/9/18   OFFICE NOTE from other specialist N/A    DISCHARGE SUMMARY from hospital N/A    DISCHARGE REPORT from the ER N/A    OPERATIVE REPORT N/A    MEDICATION LIST N/A    IMAGING  (FOR ALL VISITS)     EMG Received 3/6/18   EEG N/A    ECT N/A    MRI (HEAD, NECK, SPINE) Received -MR neck soft tissue 3/21/18  -MR spine 10/17/17   CT (HEAD, NECK, SPINE) N/A

## 2018-04-24 ENCOUNTER — OFFICE VISIT (OUTPATIENT)
Dept: NEUROLOGY | Facility: CLINIC | Age: 63
End: 2018-04-24
Payer: COMMERCIAL

## 2018-04-24 VITALS
DIASTOLIC BLOOD PRESSURE: 81 MMHG | RESPIRATION RATE: 24 BRPM | WEIGHT: 104.4 LBS | HEIGHT: 61 IN | TEMPERATURE: 98.9 F | OXYGEN SATURATION: 97 % | HEART RATE: 65 BPM | SYSTOLIC BLOOD PRESSURE: 154 MMHG | BODY MASS INDEX: 19.71 KG/M2

## 2018-04-24 DIAGNOSIS — M54.12 CERVICAL NEURALGIA: Primary | ICD-10-CM

## 2018-04-24 PROBLEM — Z00.00 ROUTINE GENERAL MEDICAL EXAMINATION AT A HEALTH CARE FACILITY: Status: ACTIVE | Noted: 2018-04-24

## 2018-04-24 PROBLEM — J38.7 IRRITABLE LARYNX SYNDROME: Status: ACTIVE | Noted: 2017-03-16

## 2018-04-24 RX ORDER — DOCUSATE SODIUM 100 MG/1
100 CAPSULE, LIQUID FILLED ORAL 2 TIMES DAILY
COMMUNITY
Start: 2017-12-29

## 2018-04-24 RX ORDER — ACETAMINOPHEN 500 MG
500 TABLET ORAL PRN
COMMUNITY
Start: 2012-03-27

## 2018-04-24 RX ORDER — GABAPENTIN 100 MG/1
CAPSULE ORAL
Qty: 30 CAPSULE | Refills: 3 | Status: SHIPPED | OUTPATIENT
Start: 2018-04-24

## 2018-04-24 RX ORDER — DIPHENOXYLATE HYDROCHLORIDE AND ATROPINE SULFATE 2.5; .025 MG/1; MG/1
1 TABLET ORAL DAILY
COMMUNITY
Start: 2008-07-11

## 2018-04-24 RX ORDER — LATANOPROST 50 UG/ML
1 SOLUTION/ DROPS OPHTHALMIC AT BEDTIME
COMMUNITY
Start: 2017-05-23

## 2018-04-24 RX ORDER — CALCIUM POLYCARBOPHIL 625 MG 625 MG/1
1 TABLET ORAL DAILY
COMMUNITY
Start: 2016-05-06

## 2018-04-24 ASSESSMENT — ENCOUNTER SYMPTOMS
VOMITING: 0
NECK MASS: 0
TROUBLE SWALLOWING: 1
SINUS CONGESTION: 1
CONSTIPATION: 0
DIARRHEA: 0
HOARSE VOICE: 0
HEARTBURN: 1
ABDOMINAL PAIN: 0
NAUSEA: 0
SINUS PAIN: 0
SORE THROAT: 0
RECTAL PAIN: 0
SMELL DISTURBANCE: 0
BLOATING: 1
BOWEL INCONTINENCE: 0
BLOOD IN STOOL: 0
JAUNDICE: 0
TASTE DISTURBANCE: 0

## 2018-04-24 ASSESSMENT — PAIN SCALES - GENERAL: PAINLEVEL: NO PAIN (0)

## 2018-04-24 NOTE — MR AVS SNAPSHOT
"              After Visit Summary   2018    CARLITOS Tiwari    MRN: [id number]           Patient Information     Date Of Birth          1955        Visit Information        Provider Department      2018 3:30 PM Harish Maxwell MD [facility] Neurology        Today's Diagnoses     Cervical neuralgia    -  1       Follow-ups after your visit        Who to contact     Please call your clinic at [phone] to:    Ask questions about your health    Make or cancel appointments    Discuss your medicines    Learn about your test results    Speak to your doctor            Additional Information About Your Visit        MyChart Information     [employer] is an electronic gateway that provides easy, online access to your medical records. With [employer], you can request a clinic appointment, read your test results, renew a prescription or communicate with your care team.     To sign up for [employer] visit the website at [email or web address]   You will be asked to enter the access code listed below, as well as some personal information. Please follow the directions to create your username and password.     Your access code is: [email or web address]  Expires: 2018  6:31 AM     Your access code will  in 90 days. If you need help or a new code, please contact your [facility] or call [phone] for assistance.        Care EveryWhere ID     This is your Care EveryWhere ID. This could be used by other organizations to access your [facility] medical records  [id number]        Your Vitals Were     Pulse Temperature Respirations Height Pulse Oximetry Breastfeeding?    65 98.9  F (37.2  C) (Oral) 24 1.537 m (5' 0.5\") 97% No    BMI (Body Mass Index)                   20.05 kg/m2            Blood Pressure from Last 3 Encounters:   18 154/81    Weight from Last 3 Encounters:   18 47.4 kg (104 lb 6.4 oz)              Today, you had the following     No orders found for display       "   Today's Medication Changes          These changes are accurate as of 4/24/18 11:59 PM.  If you have any questions, ask your nurse or doctor.               Start taking these medicines.        Dose/Directions    gabapentin 100 MG capsule   Commonly known as:  NEURONTIN   Used for:  Cervical neuralgia   Started by:  Harish Maxwell MD        1 TAB AT BEDTIME   Quantity:  30 capsule   Refills:  3         These medicines have changed or have updated prescriptions.        Dose/Directions    OMEGA-3 FATTY ACIDS PO   This may have changed:  Another medication with the same name was removed. Continue taking this medication, and follow the directions you see here.   Changed by:  Harish Maxwell MD        Dose:  1 capsule   Take 1 capsule by mouth daily   Refills:  0         Stop taking these medicines if you haven't already. Please contact your care team if you have questions.     calcium carbonate 500 tablet   Commonly known as:  OS- mg Council. Ca   Stopped by:  Harish Maxwell MD           MULTIVITAMIN ADULT PO   Stopped by:  Harish Maxwell MD                Where to get your medicines      These medications were sent to [place] [address] [place] [address] [place] [address]     Phone:  [phone]     gabapentin 100 MG capsule                Primary Care Provider Fax #    Provider Not In System [phone]                Equal Access to Services     YAMILETH GUY : Jacquelyn guilloryo Sokiersten, waaxda luqadaha, qaybta kaalmada adeegyada, steven nieves. So [facility] [phone].    ATENCIÓN: Si habla español, tiene a harrington disposición servicios gratuitos de asistencia lingüística. Llame al [phone].    We comply with applicable federal civil rights laws and [place] laws. We do not discriminate on the basis of race, color, national origin, age, disability, sex, sexual orientation, or gender identity.            Thank you!     Thank you for  choosing [facility]UROLOGY  for your care. Our goal is always to provide you with excellent care. Hearing back from our patients is one way we can continue to improve our services. Please take a few minutes to complete the written survey that you may receive in the mail after your visit with us. Thank you!             Your Updated Medication List - Protect others around you: Learn how to safely use, store and throw away your medicines at www.disposemymeds.org.          This list is accurate as of 4/24/18 11:59 PM.  Always use your most recent med list.                   Brand Name Dispense Instructions for use Diagnosis    acetaminophen 500 MG tablet    TYLENOL     Take 500 mg by mouth as needed        CALCIUM 600+D 600-200 MG-UNIT Tabs   Generic drug:  calcium carbonate-vitamin D      Take 1 tablet by mouth daily        docusate sodium 100 MG capsule    COLACE     Take 100 mg by mouth 2 times daily        FIBERCON 625 MG tablet   Generic drug:  calcium polycarbophil      Take 1 tablet by mouth daily        gabapentin 100 MG capsule    NEURONTIN    30 capsule    1 TAB AT BEDTIME    Cervical neuralgia       latanoprost 0.005 % ophthalmic solution    XALATAN     Place 1 drop into both eyes At Bedtime        MULTI-VITAMINS Tabs      Take 1 tablet by mouth daily        OMEGA-3 FATTY ACIDS PO      Take 1 capsule by mouth daily

## 2018-04-24 NOTE — LETTER
2018       RE: CARLITOS Tiwari  [address] [address]     Dear Colleague,    Thank you for referring your patient, CARLITOS Tiwari, to the [facility] NEUROLOGY at [facility]. Please see a copy of my visit note below.    Service Date: 2018      RE: Carlitos Tiwari   MRN: [id number]   : 1955      Dear Dr. Godinez:      She is 62 years old.  She was referred to [place] because of paresthesias in the left lower jaw which extend occasionally down the left arm and even the left leg.  She was evaluated in the Neurology Clinic.  She had been seen on the outside.  An MRI of the cervical spine could not be found but the reports says she has been normal.  She has had electromyogram performed but those results are not available.  She has a host of other symptoms which include difficulty sometimes with swallowing.  She has had voice problems associated with these, but this actual sensation of tingling in the left lower jaw happens when she puts the head in certain positions or when she lies down.  She has had throat and palatal pain and difficulty swallowing and she was evaluated by ENT because of throat pain and for full swallowing and she had a normal swallow study.  EG did show a small hiatal hernia.  Reflux medication has been used.  She has a long history of reflux and she also hears the palate clicking on throat clearing.  This tingling in her left lower jaw left due some dental procedure which did not relieve the symptoms.  She describes tingling in the left hand on the outside of the arm and forearm with no pain.  Left tingly is only in the big toe.  She is potentially a pretty healthy person.  Other than irritable larynx syndrome, vitamin D deficiency and osteoporosis and GERD, she has been a healthy woman.        CURRENT MEDICATIONS:   Her only medication consists of:   1.  Tylenol for the discomfort.     2.  Ophthalmic solution.     3.  Vitamins.       PAST MEDICAL HISTORY:  Her past medical history other than the GERD is pretty unremarkable.      SOCIAL HISTORY:  She is accompanied by her , is very cooperative.      PHYSICAL EXAMINATION:     GENERAL:  She is a fairly anxious woman complaining of this sensation when she puts her head a certain way.     NEUROLOGIC:   Her mental status exam is normal other than anxiety.  Cranial nerves are normal.  There is no sensory deficit on the C2 nerve root branch.  There is no tenderness and no production of paresthesia in the back of the pressure in the back of the left side of her head.  Neck range of motion is pretty intact.  Coordination in the arms and legs is normal.  Reflexes are all obtainable, symmetrical.  Sensory exam is pretty normal in the area of complaint.  I do not see any palatal myoclonus.      In summary, this woman has paresthesias in the distribution of C2 nerve without any actual deficit.  This is more likely due to minor degenerative disease in her neck.  We will try to retrieve the MRI of her cervical spine to review this area.  In the meantime, we will use gabapentin 100 mg for comfort at bedtime.  Much reassurance is needed.  We will see her as needed in the future.         D: 2018   T: 2018   MT: TANMAY      Name:     CARLITOS FERNANDEZ   MRN:      [id number]        Account:      [id number]   :      1955           Service Date: 2018      Document: [id number]        Again, thank you for allowing me to participate in the care of your patient.      Sincerely,    Harish Maxwell MD    CC:  Brayden Godinez MD   [facility] [place] [phone]

## 2018-04-24 NOTE — NURSING NOTE
Chief Complaint   Patient presents with     Consult     [facility]- 2ND OPINION LEFT SIDED FACIAL TINGLING      Nilton Underwood, CMA

## 2018-04-25 NOTE — PROGRESS NOTES
Service Date: 2018      Brayden Godinez MD   [facility]    [facility] [place] [address]      RE: Ivett Tiwari   MRN: [id number]   : 1955      Dear Dr. Godinez:      She is 62 years old.  She was referred to [place] because of paresthesias in the left lower jaw which extend occasionally down the left arm and even the left leg.  She was evaluated in the Neurology Clinic.  She had been seen on the outside.  An MRI of the cervical spine could not be found but the reports says she has been normal.  She has had electromyogram performed but those results are not available.  She has a host of other symptoms which include difficulty sometimes with swallowing.  She has had voice problems associated with these, but this actual sensation of tingling in the left lower jaw happens when she puts the head in certain positions or when she lies down.  She has had throat and palatal pain and difficulty swallowing and she was evaluated by ENT because of throat pain and for full swallowing and she had a normal swallow study.  EG did show a small hiatal hernia.  Reflux medication has been used.  She has a long history of reflux and she also hears the palate clicking on throat clearing.  This tingling in her left lower jaw left due some dental procedure which did not relieve the symptoms.  She describes tingling in the left hand on the outside of the arm and forearm with no pain.  Left tingly is only in the big toe.  She is potentially a pretty healthy person.  Other than irritable larynx syndrome, vitamin D deficiency and osteoporosis and GERD, she has been a healthy woman.        CURRENT MEDICATIONS:   Her only medication consists of:   1.  Tylenol for the discomfort.     2.  Ophthalmic solution.     3.  Vitamins.      PAST MEDICAL HISTORY:  Her past medical history other than the GERD is pretty unremarkable.      SOCIAL HISTORY:  She is accompanied by her , is very  cooperative.      PHYSICAL EXAMINATION:     GENERAL:  She is a fairly anxious woman complaining of this sensation when she puts her head a certain way.     NEUROLOGIC:   Her mental status exam is normal other than anxiety.  Cranial nerves are normal.  There is no sensory deficit on the C2 nerve root branch.  There is no tenderness and no production of paresthesia in the back of the pressure in the back of the left side of her head.  Neck range of motion is pretty intact.  Coordination in the arms and legs is normal.  Reflexes are all obtainable, symmetrical.  Sensory exam is pretty normal in the area of complaint.  I do not see any palatal myoclonus.      In summary, this woman has paresthesias in the distribution of C2 nerve without any actual deficit.  This is more likely due to minor degenerative disease in her neck.  We will try to retrieve the MRI of her cervical spine to review this area.  In the meantime, we will use gabapentin 100 mg for comfort at bedtime.  Much reassurance is needed.  We will see her as needed in the future.      Sincerely,       MD OMER Boudreaux MD             D: 2018   T: 2018   MT: TANMAY      Name:     CARLITOS FERNANDEZ   MRN:      [id number]        Account:      [id number]   :      1955           Service Date: 2018      Document: [id number]

## 2021-05-31 ENCOUNTER — RECORDS - HEALTHEAST (OUTPATIENT)
Dept: ADMINISTRATIVE | Facility: CLINIC | Age: 66
End: 2021-05-31

## 2022-01-05 ENCOUNTER — MEDICAL CORRESPONDENCE (OUTPATIENT)
Dept: HEALTH INFORMATION MANAGEMENT | Facility: CLINIC | Age: 67
End: 2022-01-05
Payer: COMMERCIAL

## 2022-01-06 ENCOUNTER — TELEPHONE (OUTPATIENT)
Dept: OTOLARYNGOLOGY | Facility: CLINIC | Age: 67
End: 2022-01-06
Payer: COMMERCIAL

## 2022-01-06 ENCOUNTER — TRANSCRIBE ORDERS (OUTPATIENT)
Dept: OTHER | Age: 67
End: 2022-01-06
Payer: COMMERCIAL

## 2022-01-06 DIAGNOSIS — R13.10 DYSPHAGIA, UNSPECIFIED TYPE: Primary | ICD-10-CM

## 2022-01-06 NOTE — TELEPHONE ENCOUNTER
[facility] Call Center    Phone Message    May a detailed message be left on voicemail: yes     Reason for Call: Appointment Intake    Referring Provider Name: Ally Norman @ [facility]  Diagnosis and/or Symptoms: Dysphagia - requesting SLP    GL's state to send TE for dx - please review   Action Taken: Message routed to:  [facility] E[facility]    Travel Screening: Not Applicable

## 2022-01-06 NOTE — TELEPHONE ENCOUNTER
[facility] Call Center    Phone Message    May a detailed message be left on voicemail: no     Reason for Call: Appointment Intake    Referring Provider Name: Dr. Ally Austin @ [facility]  Ph. [phone]  Diagnosis and/or Symptoms: Dysphagia, unspecified type [R13.10]    Action Taken: Message routed to:  [facility] [[id number]] - per protocols    Travel Screening: Not Applicable